# Patient Record
Sex: FEMALE | Race: BLACK OR AFRICAN AMERICAN | NOT HISPANIC OR LATINO | Employment: UNEMPLOYED | URBAN - METROPOLITAN AREA
[De-identification: names, ages, dates, MRNs, and addresses within clinical notes are randomized per-mention and may not be internally consistent; named-entity substitution may affect disease eponyms.]

---

## 2020-02-13 DIAGNOSIS — O35.9XX0 SUSPECTED FETAL ANOMALY, ANTEPARTUM, SINGLE OR UNSPECIFIED FETUS: Primary | ICD-10-CM

## 2020-02-17 ENCOUNTER — INITIAL CONSULT (OUTPATIENT)
Dept: MATERNAL FETAL MEDICINE | Facility: CLINIC | Age: 25
End: 2020-02-17
Payer: MEDICAID

## 2020-02-17 VITALS
BODY MASS INDEX: 22.26 KG/M2 | OXYGEN SATURATION: 98 % | HEIGHT: 71 IN | DIASTOLIC BLOOD PRESSURE: 60 MMHG | HEART RATE: 96 BPM | SYSTOLIC BLOOD PRESSURE: 108 MMHG | RESPIRATION RATE: 20 BRPM | WEIGHT: 159 LBS

## 2020-02-17 DIAGNOSIS — O35.9XX0 SUSPECTED FETAL ANOMALY, ANTEPARTUM, SINGLE OR UNSPECIFIED FETUS: ICD-10-CM

## 2020-02-17 PROCEDURE — 76805 OB US >/= 14 WKS SNGL FETUS: CPT | Mod: S$GLB,,, | Performed by: OBSTETRICS & GYNECOLOGY

## 2020-02-17 PROCEDURE — 76805 PR US, OB 14+WKS, TRANSABD, SINGLE GESTATION: ICD-10-PCS | Mod: S$GLB,,, | Performed by: OBSTETRICS & GYNECOLOGY

## 2020-02-17 PROCEDURE — 99201 PR OFFICE/OUTPT VISIT,NEW,LEVL I: ICD-10-PCS | Mod: TH,25,S$GLB, | Performed by: OBSTETRICS & GYNECOLOGY

## 2020-02-17 PROCEDURE — 99201 PR OFFICE/OUTPT VISIT,NEW,LEVL I: CPT | Mod: TH,25,S$GLB, | Performed by: OBSTETRICS & GYNECOLOGY

## 2020-02-17 NOTE — PROGRESS NOTES
Initial MFM Consultation  Referring provider: Dr. Cerrato    Indications for referral:  1) Pregnancy at  22 weeks and 4 days gestation ([facility] 6-)  2) Suspected fetal renal dilation    Dear Dr. Cerrato,  Because of weather conditions I was unable to travel to [place] for physical visits with patients.  Ms. Dao was informed of this and offered the option of rescheduling.  She declined.  Ultrasound images were reviewed remotely.  She was counseled and her questions were answered via phone.    Thank you for your kind request for consultation and imaging of your patient at the [facility] at [facility].  She presents due to the above listed indications.  Her history was reviewed and is documented in Epic. Her review of systems is normal and negative.    Physical Exam  Blood pressure is 108/60 and vital signs are otherwise normal.    ULTRASOUND FINDINGS:  A routine fetal anatomic survey was performed. EFW of 478g is at the 26th percentile.  The fetus is breech. The placenta is anterior.  Amniotic fluid is normal. There are no fetal structural malformations to extent of view, but some of the anatomy is suboptimally visualized due to fetal positioning and equipment.  The AP diameter of the left renal pelvis measures 0.4cm which is normal at this gestational age.    IMPRESSION:  22 week gestation with a normal ultrasound.    RECOMMENDATIONS/DISCUSSION:  1) We discussed the sonographic findings and their implications.  2) We will have her return in 10 weeks to reevaluate fetal anatomy.    Thank you for allowing us to participate in her care.  Please do not hesitate to call with questions.

## 2020-02-17 NOTE — PROGRESS NOTES
"Lise is here for initial MFM consultation, referred by Dr. Cerrato for bilateral fetal renal dilation.    She is feeling fetal movement.    Lise denies vaginal bleeding, loss of fluid, recurrent contractions.      Vitals:    02/17/20 0908   BP: 108/60   Pulse: 96   Resp: 20   SpO2: 98%   Weight: 72.1 kg (159 lb)   Height: 5' 11" (1.803 m)      BMI:                    22.18 kg/m^2             "

## 2020-02-17 NOTE — LETTER
February 17, 2020        Saul Cerrato MD  [address] [facility] [address]             [facility]  [address]  [place] [address]  Phone: [phone]  Fax: [phone]   Patient: Lise Dao   MR Number: [id number]   YOB: 1995   Date of Visit: 2/17/2020       Dear Dr. Cerrato:    Thank you for referring Lise Dao to me for evaluation. Attached you will find relevant portions of my assessment and plan of care.    If you have questions, please do not hesitate to call me. I look forward to following Lise Dao along with you.    Sincerely,      Charlene Velázquez MD        CC  No Recipients    Enclosure

## 2020-04-20 DIAGNOSIS — O35.9XX0 SUSPECTED FETAL ANOMALY, ANTEPARTUM, SINGLE OR UNSPECIFIED FETUS: Primary | ICD-10-CM

## 2020-04-23 ENCOUNTER — PROCEDURE VISIT (OUTPATIENT)
Dept: MATERNAL FETAL MEDICINE | Facility: CLINIC | Age: 25
End: 2020-04-23
Payer: MEDICAID

## 2020-04-23 VITALS
OXYGEN SATURATION: 98 % | DIASTOLIC BLOOD PRESSURE: 60 MMHG | BODY MASS INDEX: 22.26 KG/M2 | SYSTOLIC BLOOD PRESSURE: 108 MMHG | RESPIRATION RATE: 20 BRPM | HEIGHT: 71 IN | WEIGHT: 159 LBS | HEART RATE: 96 BPM

## 2020-04-23 DIAGNOSIS — O35.9XX0 SUSPECTED FETAL ANOMALY, ANTEPARTUM, SINGLE OR UNSPECIFIED FETUS: ICD-10-CM

## 2020-04-23 PROCEDURE — 76816 OB US FOLLOW-UP PER FETUS: CPT | Mod: S$GLB,,, | Performed by: OBSTETRICS & GYNECOLOGY

## 2020-04-23 PROCEDURE — 76816 PR  US,PREGNANT UTERUS,F/U,TRANSABD APP: ICD-10-PCS | Mod: S$GLB,,, | Performed by: OBSTETRICS & GYNECOLOGY

## 2020-04-23 PROCEDURE — 99212 PR OFFICE/OUTPT VISIT, EST, LEVL II, 10-19 MIN: ICD-10-PCS | Mod: 95,TH,25,S$GLB | Performed by: OBSTETRICS & GYNECOLOGY

## 2020-04-23 PROCEDURE — 99212 OFFICE O/P EST SF 10 MIN: CPT | Mod: 95,TH,25,S$GLB | Performed by: OBSTETRICS & GYNECOLOGY

## 2020-04-23 NOTE — PROGRESS NOTES
Follow-up [facility] Consultation  Referring provider: Dr. Cerrato    Indications for referral:  1) Pregnancy at 22  weeks and 4 days gestation ([facility] 6-)  2) Completion of the anatomic survey    Dear Dr. Cerrato,  Thank you for your kind request for consultation and imaging of your patient Ms. Dao at the [facility] at [facility]. She returns today for re-evaluation of the above listed indications.  There have been no changes in her history since her last visit here.  She has no complaints.    Physical Exam- deferred due to Covid19 restrictions.  Vital signs are normal.      ULTRASOUND FINDINGS:  A repeat ultrasound was performed. EFW of 1673 grams is at the 17th percentile.  The fetus is cephalic. The placenta is anterior.  Amniotic fluid is normal. The spine and kidneys were visualized and appear normal. There are no fetal structural malformations to extent of view.    IMPRESSION:  22 week gestation complicated by with a normal ultrasound.    RECOMMENDATIONS/DISCUSSION:  1) We discussed the sonographic findings.    TELEMEDICINE:  This encounter was via the benefit of telemedicine due to COVID-19 restrictions.  The patient agreed to telemedicine.  Dr. Velázquez was located at [facility], and the patient was located at [facility] in [place].  Total face-to-face time via electronically secure Zoom/ipad was 3 minutes.    Thank you for allowing us to participate in her care.  Please do not hesitate to call with questions.

## 2020-04-23 NOTE — PROGRESS NOTES
"Lise is here for followup [facility] consultation for previously noted bilateral fetal renal dilation, referred by Dr. Cerrato.    She is feeling fetal movement.    Lise denies vaginal bleeding, loss of fluid, recurrent contractions.    Vitals:    04/23/20 0925   BP: 108/60   Pulse: 96   Resp: 20   SpO2: 98%   Weight: 72.1 kg (159 lb)   Height: 5' 11" (1.803 m)     BMI:                    22.18 kg/m^2   "

## 2020-04-23 NOTE — LETTER
April 23, 2020        Saul Cerrato MD  [address] [facility] [address]             [facility]  [address]  [place] [address]  Phone: [phone]  Fax: [phone]   Patient: Lise Dao   MR Number: [id number]   YOB: 1995   Date of Visit: 4/23/2020       Dear Dr. Cerrato:    Thank you for referring Lise Dao to me for evaluation. Below are the relevant portions of my assessment and plan of care.            If you have questions, please do not hesitate to call me. I look forward to following Lise along with you.    Sincerely,      Charlene Velázquez MD      CC  No Recipients